# Patient Record
Sex: FEMALE | Race: WHITE | Employment: OTHER | URBAN - NONMETROPOLITAN AREA
[De-identification: names, ages, dates, MRNs, and addresses within clinical notes are randomized per-mention and may not be internally consistent; named-entity substitution may affect disease eponyms.]

---

## 2017-01-17 PROBLEM — N18.30 CHRONIC KIDNEY DISEASE, STAGE III (MODERATE) (HCC): Status: ACTIVE | Noted: 2017-01-17

## 2017-01-17 PROBLEM — I25.10 CORONARY ARTERY DISEASE INVOLVING NATIVE CORONARY ARTERY OF NATIVE HEART WITHOUT ANGINA PECTORIS: Status: ACTIVE | Noted: 2017-01-17

## 2017-01-18 ENCOUNTER — OUTSIDE SERVICES (OUTPATIENT)
Dept: INTERNAL MEDICINE | Age: 82
End: 2017-01-18

## 2017-01-18 DIAGNOSIS — I10 ESSENTIAL HYPERTENSION: ICD-10-CM

## 2017-01-18 DIAGNOSIS — F32.5 MAJOR DEPRESSIVE DISORDER WITH SINGLE EPISODE, IN REMISSION (HCC): ICD-10-CM

## 2017-01-18 DIAGNOSIS — E78.2 MIXED HYPERLIPIDEMIA: ICD-10-CM

## 2017-01-18 DIAGNOSIS — E03.8 OTHER SPECIFIED HYPOTHYROIDISM: Primary | ICD-10-CM

## 2017-01-18 DIAGNOSIS — N18.30 CHRONIC KIDNEY DISEASE, STAGE III (MODERATE) (HCC): ICD-10-CM

## 2017-01-18 DIAGNOSIS — I25.10 CORONARY ARTERY DISEASE INVOLVING NATIVE CORONARY ARTERY OF NATIVE HEART WITHOUT ANGINA PECTORIS: ICD-10-CM

## 2017-01-18 DIAGNOSIS — E22.2 SIADH (SYNDROME OF INAPPROPRIATE ADH PRODUCTION) (HCC): ICD-10-CM

## 2017-01-18 DIAGNOSIS — G24.01 TARDIVE DYSKINESIA: ICD-10-CM

## 2017-01-18 PROCEDURE — 99307 SBSQ NF CARE SF MDM 10: CPT | Performed by: INTERNAL MEDICINE

## 2017-02-21 ENCOUNTER — OUTSIDE SERVICES (OUTPATIENT)
Dept: INTERNAL MEDICINE | Age: 82
End: 2017-02-21

## 2017-02-21 DIAGNOSIS — N18.30 CHRONIC KIDNEY DISEASE, STAGE III (MODERATE) (HCC): ICD-10-CM

## 2017-02-21 DIAGNOSIS — I25.10 CORONARY ARTERY DISEASE INVOLVING NATIVE CORONARY ARTERY OF NATIVE HEART WITHOUT ANGINA PECTORIS: ICD-10-CM

## 2017-02-21 DIAGNOSIS — F32.5 MAJOR DEPRESSIVE DISORDER WITH SINGLE EPISODE, IN REMISSION (HCC): ICD-10-CM

## 2017-02-21 DIAGNOSIS — S81.812A SKIN TEAR OF LOWER LEG WITHOUT COMPLICATION, LEFT, INITIAL ENCOUNTER: Primary | ICD-10-CM

## 2017-02-21 DIAGNOSIS — E78.2 MIXED HYPERLIPIDEMIA: ICD-10-CM

## 2017-02-21 DIAGNOSIS — G24.01 TARDIVE DYSKINESIA: ICD-10-CM

## 2017-02-21 DIAGNOSIS — E03.8 OTHER SPECIFIED HYPOTHYROIDISM: ICD-10-CM

## 2017-02-21 DIAGNOSIS — E22.2 SIADH (SYNDROME OF INAPPROPRIATE ADH PRODUCTION) (HCC): ICD-10-CM

## 2017-02-21 DIAGNOSIS — I10 ESSENTIAL HYPERTENSION: ICD-10-CM

## 2017-02-21 PROCEDURE — 99308 SBSQ NF CARE LOW MDM 20: CPT | Performed by: INTERNAL MEDICINE

## 2017-03-24 ENCOUNTER — OUTSIDE SERVICES (OUTPATIENT)
Dept: INTERNAL MEDICINE | Age: 82
End: 2017-03-24
Payer: MEDICARE

## 2017-03-24 DIAGNOSIS — F32.5 MAJOR DEPRESSIVE DISORDER WITH SINGLE EPISODE, IN REMISSION (HCC): ICD-10-CM

## 2017-03-24 DIAGNOSIS — E03.8 OTHER SPECIFIED HYPOTHYROIDISM: Primary | ICD-10-CM

## 2017-03-24 DIAGNOSIS — N18.30 CHRONIC KIDNEY DISEASE, STAGE III (MODERATE) (HCC): ICD-10-CM

## 2017-03-24 DIAGNOSIS — E78.2 MIXED HYPERLIPIDEMIA: ICD-10-CM

## 2017-03-24 DIAGNOSIS — G24.01 TARDIVE DYSKINESIA: ICD-10-CM

## 2017-03-24 DIAGNOSIS — I25.10 CORONARY ARTERY DISEASE INVOLVING NATIVE CORONARY ARTERY OF NATIVE HEART WITHOUT ANGINA PECTORIS: ICD-10-CM

## 2017-03-24 DIAGNOSIS — I10 ESSENTIAL HYPERTENSION: ICD-10-CM

## 2017-03-24 DIAGNOSIS — E22.2 SIADH (SYNDROME OF INAPPROPRIATE ADH PRODUCTION) (HCC): ICD-10-CM

## 2017-03-24 PROCEDURE — 99307 SBSQ NF CARE SF MDM 10: CPT | Performed by: INTERNAL MEDICINE

## 2017-04-18 LAB — TSH SERPL DL<=0.05 MIU/L-ACNC: 5.32 MIU/L (ref 0.3–5)

## 2017-04-21 ENCOUNTER — TELEPHONE (OUTPATIENT)
Dept: INTERNAL MEDICINE | Age: 82
End: 2017-04-21

## 2017-04-21 RX ORDER — LEVOTHYROXINE SODIUM 0.1 MG/1
100 TABLET ORAL DAILY
COMMUNITY

## 2017-04-27 LAB
ANION GAP SERPL CALCULATED.3IONS-SCNC: 12 MMOL/L (ref 9–17)
BUN BLDV-MCNC: 18 MG/DL (ref 8–23)
BUN/CREAT BLD: 24 (ref 9–20)
CALCIUM SERPL-MCNC: 9.2 MG/DL (ref 8.6–10.4)
CHLORIDE BLD-SCNC: 103 MMOL/L (ref 98–107)
CO2: 28 MMOL/L (ref 20–31)
CREAT SERPL-MCNC: 0.75 MG/DL (ref 0.5–0.9)
GFR AFRICAN AMERICAN: >60 ML/MIN
GFR NON-AFRICAN AMERICAN: >60 ML/MIN
GFR SERPL CREATININE-BSD FRML MDRD: ABNORMAL ML/MIN/{1.73_M2}
GFR SERPL CREATININE-BSD FRML MDRD: ABNORMAL ML/MIN/{1.73_M2}
GLUCOSE BLD-MCNC: 95 MG/DL (ref 70–99)
POTASSIUM SERPL-SCNC: 4.2 MMOL/L (ref 3.7–5.3)
SODIUM BLD-SCNC: 143 MMOL/L (ref 135–144)

## 2017-04-30 PROCEDURE — 99307 SBSQ NF CARE SF MDM 10: CPT | Performed by: INTERNAL MEDICINE

## 2017-05-01 ENCOUNTER — OUTSIDE SERVICES (OUTPATIENT)
Dept: INTERNAL MEDICINE | Age: 82
End: 2017-05-01
Payer: MEDICARE

## 2017-05-01 DIAGNOSIS — G24.01 TARDIVE DYSKINESIA: ICD-10-CM

## 2017-05-01 DIAGNOSIS — F32.5 MAJOR DEPRESSIVE DISORDER WITH SINGLE EPISODE, IN REMISSION (HCC): ICD-10-CM

## 2017-05-01 DIAGNOSIS — E22.2 SIADH (SYNDROME OF INAPPROPRIATE ADH PRODUCTION) (HCC): ICD-10-CM

## 2017-05-01 DIAGNOSIS — I10 ESSENTIAL HYPERTENSION: ICD-10-CM

## 2017-05-01 DIAGNOSIS — I25.10 CORONARY ARTERY DISEASE INVOLVING NATIVE CORONARY ARTERY OF NATIVE HEART WITHOUT ANGINA PECTORIS: ICD-10-CM

## 2017-05-01 DIAGNOSIS — N18.30 CHRONIC KIDNEY DISEASE, STAGE III (MODERATE) (HCC): ICD-10-CM

## 2017-05-01 DIAGNOSIS — E78.2 MIXED HYPERLIPIDEMIA: ICD-10-CM

## 2017-05-01 DIAGNOSIS — E03.8 OTHER SPECIFIED HYPOTHYROIDISM: Primary | ICD-10-CM

## 2017-05-04 ENCOUNTER — TELEPHONE (OUTPATIENT)
Dept: INTERNAL MEDICINE | Age: 82
End: 2017-05-04

## 2017-05-30 ENCOUNTER — OUTSIDE SERVICES (OUTPATIENT)
Dept: INTERNAL MEDICINE | Age: 82
End: 2017-05-30
Payer: MEDICARE

## 2017-05-30 DIAGNOSIS — E03.8 OTHER SPECIFIED HYPOTHYROIDISM: Primary | ICD-10-CM

## 2017-05-30 DIAGNOSIS — G24.01 TARDIVE DYSKINESIA: ICD-10-CM

## 2017-05-30 DIAGNOSIS — E22.2 SIADH (SYNDROME OF INAPPROPRIATE ADH PRODUCTION) (HCC): ICD-10-CM

## 2017-05-30 DIAGNOSIS — I25.10 CORONARY ARTERY DISEASE INVOLVING NATIVE CORONARY ARTERY OF NATIVE HEART WITHOUT ANGINA PECTORIS: ICD-10-CM

## 2017-05-30 DIAGNOSIS — E78.2 MIXED HYPERLIPIDEMIA: ICD-10-CM

## 2017-05-30 DIAGNOSIS — F32.5 MAJOR DEPRESSIVE DISORDER WITH SINGLE EPISODE, IN REMISSION (HCC): ICD-10-CM

## 2017-05-30 DIAGNOSIS — I10 ESSENTIAL HYPERTENSION: ICD-10-CM

## 2017-05-30 DIAGNOSIS — N18.30 CHRONIC KIDNEY DISEASE, STAGE III (MODERATE) (HCC): ICD-10-CM

## 2017-05-30 PROCEDURE — 99307 SBSQ NF CARE SF MDM 10: CPT | Performed by: INTERNAL MEDICINE

## 2017-06-25 PROCEDURE — 99307 SBSQ NF CARE SF MDM 10: CPT | Performed by: INTERNAL MEDICINE

## 2017-07-15 ENCOUNTER — OUTSIDE SERVICES (OUTPATIENT)
Dept: INTERNAL MEDICINE | Age: 82
End: 2017-07-15
Payer: MEDICARE

## 2017-07-15 DIAGNOSIS — N18.30 CHRONIC KIDNEY DISEASE, STAGE III (MODERATE) (HCC): ICD-10-CM

## 2017-07-15 DIAGNOSIS — E22.2 SIADH (SYNDROME OF INAPPROPRIATE ADH PRODUCTION) (HCC): ICD-10-CM

## 2017-07-15 DIAGNOSIS — F32.5 MAJOR DEPRESSIVE DISORDER WITH SINGLE EPISODE, IN REMISSION (HCC): ICD-10-CM

## 2017-07-15 DIAGNOSIS — I25.10 CORONARY ARTERY DISEASE INVOLVING NATIVE CORONARY ARTERY OF NATIVE HEART WITHOUT ANGINA PECTORIS: ICD-10-CM

## 2017-07-15 DIAGNOSIS — E78.2 MIXED HYPERLIPIDEMIA: ICD-10-CM

## 2017-07-15 DIAGNOSIS — G24.01 TARDIVE DYSKINESIA: ICD-10-CM

## 2017-07-15 DIAGNOSIS — E03.8 OTHER SPECIFIED HYPOTHYROIDISM: Primary | ICD-10-CM

## 2017-07-15 DIAGNOSIS — I10 ESSENTIAL HYPERTENSION: ICD-10-CM

## 2017-07-21 LAB
THYROXINE, FREE: 1.25 NG/DL (ref 0.93–1.7)
TSH SERPL DL<=0.05 MIU/L-ACNC: 12.44 MIU/L (ref 0.3–5)

## 2017-07-24 PROCEDURE — 99307 SBSQ NF CARE SF MDM 10: CPT | Performed by: INTERNAL MEDICINE

## 2017-07-27 LAB — THYROXINE, FREE: 1.23 NG/DL (ref 0.93–1.7)

## 2017-08-15 ENCOUNTER — OUTSIDE SERVICES (OUTPATIENT)
Dept: INTERNAL MEDICINE | Age: 82
End: 2017-08-15
Payer: MEDICARE

## 2017-08-15 DIAGNOSIS — G24.01 TARDIVE DYSKINESIA: ICD-10-CM

## 2017-08-15 DIAGNOSIS — E78.2 MIXED HYPERLIPIDEMIA: ICD-10-CM

## 2017-08-15 DIAGNOSIS — E22.2 SIADH (SYNDROME OF INAPPROPRIATE ADH PRODUCTION) (HCC): ICD-10-CM

## 2017-08-15 DIAGNOSIS — I25.10 CORONARY ARTERY DISEASE INVOLVING NATIVE CORONARY ARTERY OF NATIVE HEART WITHOUT ANGINA PECTORIS: ICD-10-CM

## 2017-08-15 DIAGNOSIS — F32.5 MAJOR DEPRESSIVE DISORDER WITH SINGLE EPISODE, IN REMISSION (HCC): ICD-10-CM

## 2017-08-15 DIAGNOSIS — N18.30 CHRONIC KIDNEY DISEASE, STAGE III (MODERATE) (HCC): ICD-10-CM

## 2017-08-15 DIAGNOSIS — I10 ESSENTIAL HYPERTENSION: ICD-10-CM

## 2017-08-15 DIAGNOSIS — E03.4 HYPOTHYROIDISM DUE TO ACQUIRED ATROPHY OF THYROID: Primary | ICD-10-CM

## 2017-08-20 PROCEDURE — 99307 SBSQ NF CARE SF MDM 10: CPT | Performed by: INTERNAL MEDICINE

## 2017-09-18 ENCOUNTER — OUTSIDE SERVICES (OUTPATIENT)
Dept: INTERNAL MEDICINE | Age: 82
End: 2017-09-18
Payer: MEDICARE

## 2017-09-18 DIAGNOSIS — F32.5 MAJOR DEPRESSIVE DISORDER WITH SINGLE EPISODE, IN REMISSION (HCC): ICD-10-CM

## 2017-09-18 DIAGNOSIS — I10 ESSENTIAL HYPERTENSION: Primary | ICD-10-CM

## 2017-09-18 DIAGNOSIS — E78.2 MIXED HYPERLIPIDEMIA: ICD-10-CM

## 2017-09-18 DIAGNOSIS — E03.4 HYPOTHYROIDISM DUE TO ACQUIRED ATROPHY OF THYROID: ICD-10-CM

## 2017-09-18 DIAGNOSIS — E22.2 SIADH (SYNDROME OF INAPPROPRIATE ADH PRODUCTION) (HCC): ICD-10-CM

## 2017-09-18 DIAGNOSIS — G24.01 TARDIVE DYSKINESIA: ICD-10-CM

## 2017-09-18 DIAGNOSIS — N18.30 CHRONIC KIDNEY DISEASE, STAGE III (MODERATE) (HCC): ICD-10-CM

## 2017-09-20 ENCOUNTER — HOSPITAL ENCOUNTER (OUTPATIENT)
Age: 82
Setting detail: SPECIMEN
Discharge: HOME | End: 2017-09-20
Payer: MEDICARE

## 2017-09-20 LAB
THYROXINE, FREE: 1.68 NG/DL (ref 0.93–1.7)
TSH SERPL DL<=0.05 MIU/L-ACNC: 1.95 MIU/L (ref 0.3–5)

## 2017-09-20 PROCEDURE — 36415 COLL VENOUS BLD VENIPUNCTURE: CPT

## 2017-09-20 PROCEDURE — 84443 ASSAY THYROID STIM HORMONE: CPT

## 2017-09-20 PROCEDURE — 84439 ASSAY OF FREE THYROXINE: CPT

## 2017-09-24 PROCEDURE — 99307 SBSQ NF CARE SF MDM 10: CPT | Performed by: INTERNAL MEDICINE

## 2017-10-09 ENCOUNTER — TELEPHONE (OUTPATIENT)
Dept: INTERNAL MEDICINE | Age: 82
End: 2017-10-09

## 2017-10-10 ENCOUNTER — HOSPITAL ENCOUNTER (OUTPATIENT)
Age: 82
Setting detail: SPECIMEN
Discharge: HOME | End: 2017-10-10
Payer: MEDICARE

## 2017-10-10 LAB
-: ABNORMAL
AMORPHOUS: ABNORMAL
BACTERIA: ABNORMAL
BILIRUBIN URINE: NEGATIVE
CASTS UA: ABNORMAL /LPF (ref 0–2)
COLOR: ABNORMAL
COMMENT UA: ABNORMAL
CRYSTALS, UA: ABNORMAL /HPF
EPITHELIAL CELLS UA: ABNORMAL /HPF (ref 0–5)
GLUCOSE URINE: NEGATIVE
KETONES, URINE: NEGATIVE
LEUKOCYTE ESTERASE, URINE: NEGATIVE
MUCUS: ABNORMAL
NITRITE, URINE: POSITIVE
OTHER OBSERVATIONS UA: ABNORMAL
PH UA: 5.5 (ref 5–6)
PROTEIN UA: NEGATIVE
RBC UA: ABNORMAL /HPF (ref 0–4)
RENAL EPITHELIAL, UA: ABNORMAL /HPF
SPECIFIC GRAVITY UA: 1.02 (ref 1.01–1.02)
TRICHOMONAS: ABNORMAL
TURBIDITY: ABNORMAL
URINE HGB: ABNORMAL
UROBILINOGEN, URINE: NORMAL
WBC UA: ABNORMAL /HPF (ref 0–4)
YEAST: PRESENT

## 2017-10-10 PROCEDURE — 87086 URINE CULTURE/COLONY COUNT: CPT

## 2017-10-10 PROCEDURE — 87186 SC STD MICRODIL/AGAR DIL: CPT

## 2017-10-10 PROCEDURE — 81001 URINALYSIS AUTO W/SCOPE: CPT

## 2017-10-10 PROCEDURE — 87088 URINE BACTERIA CULTURE: CPT

## 2017-10-11 LAB
CULTURE: ABNORMAL
CULTURE: ABNORMAL
Lab: ABNORMAL
ORGANISM: ABNORMAL
SPECIMEN DESCRIPTION: ABNORMAL
SPECIMEN DESCRIPTION: ABNORMAL
STATUS: ABNORMAL

## 2017-10-11 PROCEDURE — 99308 SBSQ NF CARE LOW MDM 20: CPT | Performed by: NURSE PRACTITIONER

## 2017-10-12 ENCOUNTER — TELEPHONE (OUTPATIENT)
Dept: INTERNAL MEDICINE | Age: 82
End: 2017-10-12

## 2017-10-12 PROCEDURE — 99308 SBSQ NF CARE LOW MDM 20: CPT | Performed by: NURSE PRACTITIONER

## 2017-10-12 NOTE — TELEPHONE ENCOUNTER
----- Message from Anastasia Cristobal DO sent at 10/12/2017  2:57 PM EDT -----  Has UTI. What antibiotic is she on? Check with Alexey.

## 2017-10-22 ENCOUNTER — OUTSIDE SERVICES (OUTPATIENT)
Dept: INTERNAL MEDICINE | Age: 82
End: 2017-10-22
Payer: MEDICARE

## 2017-10-22 DIAGNOSIS — N39.0 URINARY TRACT INFECTION WITHOUT HEMATURIA, SITE UNSPECIFIED: Primary | ICD-10-CM

## 2017-10-22 ASSESSMENT — ENCOUNTER SYMPTOMS
EYE DISCHARGE: 0
VOMITING: 0
EYE REDNESS: 0
CONSTIPATION: 0
WHEEZING: 0
ABDOMINAL PAIN: 0
SORE THROAT: 0
EYE PAIN: 0
ORTHOPNEA: 0
BLOOD IN STOOL: 0
COUGH: 0
BACK PAIN: 0
SHORTNESS OF BREATH: 0
DIARRHEA: 0
NAUSEA: 0

## 2017-10-22 NOTE — PROGRESS NOTES
10/12/17  Erick Si  5/30/1922  Resident of [facility] [place]    Chief Complaint  Urinary frequency, urinary urgency, change in mentation   `  HPI:  Same patient today for complaints of urinary frequency. Nursing aides noticed she has been urinating more frequenct. Odor to urine. No fevers. No chills. Vitals have been stable. Patient slightly more confused. UA completed. On chart for review.     Allergies   Allergen Reactions    Prednisone      GI upset    Quinapril Hcl      Cough    Vicodin [Hydrocodone-Acetaminophen]      Incontinence       Past Medical History:   Diagnosis Date    Altered mental status, unspecified 11/20/2015    Likely medication related-tricyclic antidepressant     Benign essential tremor     right hand    Chest pain 9/21/2010    Chronic kidney disease, stage III (moderate) 1/17/2017    Coronary artery disease involving native coronary artery of native heart without angina pectoris 1/17/2017    Dermatitis     possibly monilia, buttocks, improved    DJD (degenerative joint disease)     Gait apraxia     Mild    Hyperlipidemia     Hypertension     hypertensive urgency, 9/21/2010    Hypothyroidism     Iron deficiency anemia     Lower urinary tract infectious disease 4/8/2014     replace inactive diagnosis    Major depressive disorder with single episode, in remission ([address] 7/25/2013    Orthostatic hypotension 4/7/2014    Osteoarthritis     SIADH (syndrome of inappropriate ADH production) ([address]     improved    Subdural hematoma ([address] September 2009    drained    Symptoms involving urinary system     Postmenopausal, treated with Premarin cream in the past    Syncope, near 4/7/2014    Tardive dyskinesia 11/20/2015       Past Surgical History:   Procedure Laterality Date    APPENDECTOMY  1940    INTRACAPSULAR CATARACT EXTRACTION Bilateral 2000    SUBDURAL HEMORRHAGE DRAINAGE  September 2009    Status post    DINORAH AND BSO  5602    UMBILICAL HERNIA REPAIR [facility]    Social History     Social History    Marital status:      Spouse name: N/A    Number of children: N/A    Years of education: 15     Occupational History    Not on file. Social History Main Topics    Smoking status: Never Smoker    Smokeless tobacco: Never Used      Comment: s beronica 3/30/15    Alcohol use No      Comment: occasionally    Drug use: No    Sexual activity: Not on file     Other Topics Concern    Not on file     Social History Narrative    Born in [place], worked as a  in the past       Review of Systems   Constitutional: Negative for chills and fever. HENT: Negative for sore throat. Eyes: Negative for pain, discharge and redness. Respiratory: Negative for cough, shortness of breath and wheezing. Cardiovascular: Negative for chest pain, orthopnea and leg swelling. Gastrointestinal: Negative for abdominal pain, blood in stool, constipation, diarrhea, nausea and vomiting. Genitourinary: Positive for frequency and urgency. Negative for dysuria, flank pain and hematuria. Musculoskeletal: Negative for back pain, myalgias and neck pain. Skin: Negative for rash. Neurological: Negative for dizziness, tremors, sensory change, speech change, seizures, weakness and headaches. Psychiatric/Behavioral: Positive for memory loss. Negative for depression. The patient is not nervous/anxious. Physical Exam   Constitutional: She is well-developed, well-nourished, and in no distress. No distress. HENT:   Head: Normocephalic and atraumatic. Right Ear: External ear normal.   Left Ear: External ear normal.   Eyes: Right eye exhibits no discharge. Left eye exhibits no discharge. Neck: Neck supple. No tracheal deviation present. Cardiovascular: Normal rate and regular rhythm. Exam reveals no gallop and no friction rub. No murmur heard.   Pulmonary/Chest: Effort normal and breath sounds normal. No respiratory distress. She has no wheezes. She has no rales. She exhibits no tenderness. Abdominal: Soft. Bowel sounds are normal. She exhibits no distension. There is no tenderness. No CVA tenderness, no suprapubic pain    Musculoskeletal: She exhibits no edema. Neurological: She is alert. No cranial nerve deficit. Skin: Skin is warm and dry. No rash noted. She is not diaphoretic. Psychiatric: Mood and affect normal.     Vital signs as per the chart at [facility]/reviewed/afebrile/stable    Assessment:  1. Urinary tract infection without hematuria, site unspecified  Bactrim DS one tab twice a day for 5 days, increase fluid. Culture shows susceptibility to Bactrim. Plan:  As noted above. Follow up for routine visit. Call sooner with concerns prior.     Electronically signed by Chantel Tabares CNP on 10/22/2017 at 2:08 PM

## 2017-10-23 ENCOUNTER — OUTSIDE SERVICES (OUTPATIENT)
Dept: INTERNAL MEDICINE | Age: 82
End: 2017-10-23
Payer: MEDICARE

## 2017-10-23 DIAGNOSIS — H61.22 IMPACTED CERUMEN, LEFT EAR: Primary | ICD-10-CM

## 2017-10-23 ASSESSMENT — ENCOUNTER SYMPTOMS
WHEEZING: 0
EYE DISCHARGE: 0
SHORTNESS OF BREATH: 0
SINUS PAIN: 0
COUGH: 0
ORTHOPNEA: 0
EYE REDNESS: 0
EYE PAIN: 0
SORE THROAT: 0

## 2017-10-23 NOTE — PROGRESS NOTES
10/11/17  Chang Smith  5/30/1922  Resident at the [facility]. Chief Complaint: left ear pain     1. Impacted cerumen, left ear        HPI:  Patient has been complaining to nursing staff over the last few weeks with left ear discomfort. Nothing makes it worse nothing makes it better. No trauma. No drainage. No fevers or chills. No upper respiratory complaints. No cough. No current treatment. Progressively worsening.     Allergies   Allergen Reactions    Prednisone      GI upset    Quinapril Hcl      Cough    Vicodin [Hydrocodone-Acetaminophen]      Incontinence       Past Medical History:   Diagnosis Date    Altered mental status, unspecified 11/20/2015    Likely medication related-tricyclic antidepressant     Benign essential tremor     right hand    Chest pain 9/21/2010    Chronic kidney disease, stage III (moderate) 1/17/2017    Coronary artery disease involving native coronary artery of native heart without angina pectoris 1/17/2017    Dermatitis     possibly monilia, buttocks, improved    DJD (degenerative joint disease)     Gait apraxia     Mild    Hyperlipidemia     Hypertension     hypertensive urgency, 9/21/2010    Hypothyroidism     Iron deficiency anemia     Lower urinary tract infectious disease 4/8/2014     replace inactive diagnosis    Major depressive disorder with single episode, in remission ([address] 7/25/2013    Orthostatic hypotension 4/7/2014    Osteoarthritis     SIADH (syndrome of inappropriate ADH production) ([address]     improved    Subdural hematoma ([address] September 2009    drained    Symptoms involving urinary system     Postmenopausal, treated with Premarin cream in the past    Syncope, near 4/7/2014    Tardive dyskinesia 11/20/2015       Past Surgical History:   Procedure Laterality Date    APPENDECTOMY  1940    INTRACAPSULAR CATARACT EXTRACTION Bilateral 2000    SUBDURAL HEMORRHAGE DRAINAGE  September 2009    Status post   [address] and atraumatic. Right Ear: External ear normal. No drainage, swelling or tenderness. Tympanic membrane is not erythematous, not retracted and not bulging. No decreased hearing is noted. Left Ear: There is tenderness. No drainage or swelling. Decreased hearing is noted. Large amount of light calvo cerumen to left canal- unable to visualize TM. No drainage    Eyes: Right eye exhibits no discharge. Left eye exhibits no discharge. Neck: Neck supple. No tracheal deviation present. Cardiovascular: Normal rate, regular rhythm and normal heart sounds. Exam reveals no gallop and no friction rub. No murmur heard. Pulmonary/Chest: Effort normal and breath sounds normal. No respiratory distress. She has no wheezes. She has no rales. She exhibits no tenderness. Abdominal: Soft. Bowel sounds are normal. She exhibits no distension. There is no tenderness. Musculoskeletal: She exhibits edema. Neurological: She is alert. Skin: Skin is warm and dry. No rash noted. She is not diaphoretic. Psychiatric: Affect normal.   Nursing note and vitals reviewed. Vital signs: Temperature 97.8°F, blood pressure 126/77 pulse 68, respirations 16 SPO2 95% RA. Assessment:  1. Impacted cerumen, left ear  D Brox 4 drops twice a day for the next 4 days then irrigate. Notify me if worsening or persisting. Plan:  As noted above. Follow up for routine visit. Call sooner with concerns prior.     Electronically signed by Daisy Dueñas CNP on 10/23/2017 at 2:48 PM

## 2017-10-28 ENCOUNTER — OUTSIDE SERVICES (OUTPATIENT)
Dept: INTERNAL MEDICINE | Age: 82
End: 2017-10-28
Payer: MEDICARE

## 2017-10-28 DIAGNOSIS — G24.01 TARDIVE DYSKINESIA: ICD-10-CM

## 2017-10-28 DIAGNOSIS — E03.4 HYPOTHYROIDISM DUE TO ACQUIRED ATROPHY OF THYROID: ICD-10-CM

## 2017-10-28 DIAGNOSIS — F32.5 MAJOR DEPRESSIVE DISORDER WITH SINGLE EPISODE, IN REMISSION (HCC): ICD-10-CM

## 2017-10-28 DIAGNOSIS — I10 ESSENTIAL HYPERTENSION: Primary | ICD-10-CM

## 2017-10-28 DIAGNOSIS — E78.2 MIXED HYPERLIPIDEMIA: ICD-10-CM

## 2017-10-28 DIAGNOSIS — E22.2 SIADH (SYNDROME OF INAPPROPRIATE ADH PRODUCTION) (HCC): ICD-10-CM

## 2017-10-28 DIAGNOSIS — N18.30 CHRONIC KIDNEY DISEASE, STAGE III (MODERATE) (HCC): ICD-10-CM

## 2017-10-29 PROCEDURE — 99307 SBSQ NF CARE SF MDM 10: CPT | Performed by: INTERNAL MEDICINE

## 2017-10-29 NOTE — PROGRESS NOTES
updates summarized and entered into electronic record  3. See nursing home orders and MAR.       Electronically signed by Scar Castro DO on 10/28/2017 at 9:49 PM  Internal Medicine

## 2017-11-19 ENCOUNTER — OUTSIDE SERVICES (OUTPATIENT)
Dept: INTERNAL MEDICINE | Age: 82
End: 2017-11-19
Payer: MEDICARE

## 2017-11-19 DIAGNOSIS — N18.30 CHRONIC KIDNEY DISEASE, STAGE III (MODERATE) (HCC): ICD-10-CM

## 2017-11-19 DIAGNOSIS — E03.4 HYPOTHYROIDISM DUE TO ACQUIRED ATROPHY OF THYROID: ICD-10-CM

## 2017-11-19 DIAGNOSIS — F32.5 MAJOR DEPRESSIVE DISORDER WITH SINGLE EPISODE, IN REMISSION (HCC): ICD-10-CM

## 2017-11-19 DIAGNOSIS — E78.2 MIXED HYPERLIPIDEMIA: ICD-10-CM

## 2017-11-19 DIAGNOSIS — E22.2 SIADH (SYNDROME OF INAPPROPRIATE ADH PRODUCTION) (HCC): ICD-10-CM

## 2017-11-19 DIAGNOSIS — I10 ESSENTIAL HYPERTENSION: Primary | ICD-10-CM

## 2017-11-19 DIAGNOSIS — G24.01 TARDIVE DYSKINESIA: ICD-10-CM

## 2017-11-20 ENCOUNTER — TELEPHONE (OUTPATIENT)
Dept: INTERNAL MEDICINE | Age: 82
End: 2017-11-20

## 2017-11-20 NOTE — PROGRESS NOTES
updates summarized and entered into electronic record  3. See nursing home orders and MAR.       Electronically signed by Carlos Garcia DO on 11/19/2017 at 9:15 PM  Internal Medicine

## 2017-11-21 PROCEDURE — 99307 SBSQ NF CARE SF MDM 10: CPT | Performed by: INTERNAL MEDICINE

## 2017-11-23 ENCOUNTER — HOSPITAL ENCOUNTER (OUTPATIENT)
Age: 82
Setting detail: SPECIMEN
Discharge: HOME | End: 2017-11-23
Payer: MEDICARE

## 2017-11-23 LAB
-: ABNORMAL
AMORPHOUS: ABNORMAL
BACTERIA: ABNORMAL
BILIRUBIN URINE: NEGATIVE
CASTS UA: ABNORMAL /LPF (ref 0–2)
COLOR: ABNORMAL
COMMENT UA: ABNORMAL
CRYSTALS, UA: ABNORMAL /HPF
EPITHELIAL CELLS UA: ABNORMAL /HPF (ref 0–5)
GLUCOSE URINE: NEGATIVE
KETONES, URINE: NEGATIVE
LEUKOCYTE ESTERASE, URINE: ABNORMAL
MUCUS: ABNORMAL
NITRITE, URINE: POSITIVE
OTHER OBSERVATIONS UA: ABNORMAL
PH UA: >=9 (ref 5–6)
PROTEIN UA: ABNORMAL
RBC UA: ABNORMAL /HPF (ref 0–4)
RENAL EPITHELIAL, UA: ABNORMAL /HPF
SPECIFIC GRAVITY UA: 1 (ref 1.01–1.02)
TRICHOMONAS: ABNORMAL
TURBIDITY: ABNORMAL
URINE HGB: ABNORMAL
UROBILINOGEN, URINE: NORMAL
WBC UA: ABNORMAL /HPF (ref 0–4)
YEAST: ABNORMAL

## 2017-11-23 PROCEDURE — 87086 URINE CULTURE/COLONY COUNT: CPT

## 2017-11-23 PROCEDURE — 87186 SC STD MICRODIL/AGAR DIL: CPT

## 2017-11-23 PROCEDURE — 81001 URINALYSIS AUTO W/SCOPE: CPT

## 2017-11-23 PROCEDURE — 87088 URINE BACTERIA CULTURE: CPT

## 2017-11-26 LAB
CULTURE: ABNORMAL
Lab: ABNORMAL
ORGANISM: ABNORMAL
SPECIMEN DESCRIPTION: ABNORMAL
SPECIMEN DESCRIPTION: ABNORMAL
STATUS: ABNORMAL

## 2017-11-27 ENCOUNTER — TELEPHONE (OUTPATIENT)
Dept: INTERNAL MEDICINE | Age: 82
End: 2017-11-27

## 2017-11-27 NOTE — TELEPHONE ENCOUNTER
The note I got said Dr. Ermias Chris ordered Zosyn. Did he actually not? Order Zosyn 3.375 grams IV every 6 hours for 10 days.

## 2017-11-28 ENCOUNTER — HOSPITAL ENCOUNTER (EMERGENCY)
Age: 82
Discharge: HOME | End: 2017-11-28
Attending: EMERGENCY MEDICINE
Payer: MEDICARE

## 2017-11-28 VITALS
SYSTOLIC BLOOD PRESSURE: 126 MMHG | DIASTOLIC BLOOD PRESSURE: 59 MMHG | RESPIRATION RATE: 18 BRPM | HEART RATE: 76 BPM | OXYGEN SATURATION: 95 %

## 2017-11-28 DIAGNOSIS — N39.0 URINARY TRACT INFECTION WITHOUT HEMATURIA, SITE UNSPECIFIED: Primary | ICD-10-CM

## 2017-11-28 PROCEDURE — 99283 EMERGENCY DEPT VISIT LOW MDM: CPT

## 2017-11-28 ASSESSMENT — ENCOUNTER SYMPTOMS
DIARRHEA: 0
ABDOMINAL PAIN: 0
VOMITING: 0
NAUSEA: 0
BACK PAIN: 0
RHINORRHEA: 0
EYE PAIN: 0
COUGH: 0
SHORTNESS OF BREATH: 0
SORE THROAT: 0

## 2017-11-29 NOTE — ED PROVIDER NOTES
(syndrome of inappropriate ADH production) ([facility] Subdural hematoma ([facility] Symptoms involving urinary system; Syncope, near; and Tardive dyskinesia. SURGICAL HISTORY      has a past surgical history that includes Subdural hemorrhage drainage (2009); leia and bso (cervix removed) (); Appendectomy (); Intracapsular cataract extraction (Bilateral, 0743); and Umbilical hernia repair. CURRENT MEDICATIONS       Current Discharge Medication List      CONTINUE these medications which have NOT CHANGED    Details   piperacillin-tazobactam (ZOSYN) 3-0.375 GM per 50ML IVPB extended infusion Infuse 3.375 g intravenously every 6 hours      levothyroxine (SYNTHROID) 100 MCG tablet Take 100 mcg by mouth Daily      amoxapine (ASENDIN) 150 MG tablet Take 1 tablet by mouth nightly  Qty: 30 tablet, Refills: 0      ferrous sulfate 325 (65 FE) MG tablet Take 325 mg by mouth 2 times daily      docusate sodium (COLACE) 100 MG capsule Take 100 mg by mouth 2 times daily      acetaminophen (TYLENOL) 325 MG tablet Take 650 mg by mouth every 6 hours as needed for Fever      Handicap Placard MISC by Does not apply route. Expires in 3 years  Qty: 1 each, Refills: 0             ALLERGIES     is allergic to prednisone; quinapril hcl; and vicodin [hydrocodone-acetaminophen]. FAMILY HISTORY     indicated that her mother is . She indicated that her father is . family history includes Heart Disease in her father; Other in her mother. SOCIAL HISTORY      reports that she has never smoked. She has never used smokeless tobacco. She reports that she does not drink alcohol or use drugs. PHYSICAL EXAM     INITIAL VITALS:  blood pressure is 126/59 (abnormal) and her pulse is 76. Her respiration is 18 and oxygen saturation is 95%. Physical Exam   Constitutional: She is oriented to person, place, and time and well-developed, well-nourished, and in no distress.    HENT:   Head: Normocephalic and atraumatic. Eyes: EOM are normal. Right eye exhibits no discharge. Left eye exhibits no discharge. Neck: Normal range of motion. Neck supple. No spinous process tenderness and no muscular tenderness present. Cardiovascular: Normal rate, regular rhythm and normal heart sounds. Pulmonary/Chest: Effort normal and breath sounds normal. No respiratory distress. She has no wheezes. Abdominal: Soft. Bowel sounds are normal. There is no tenderness. Musculoskeletal: Normal range of motion. Neurological: She is alert and oriented to person, place, and time. GCS score is 15. Skin: Skin is warm and dry. No rash noted. Psychiatric: Affect normal.       DIFFERENTIAL DIAGNOSIS/ MDM:     Plan this time will be to establish peripheral IV access. DIAGNOSTIC RESULTS     EKG: All EKG's are interpreted by the Emergency Department Physician who either signs or Co-signs this chart in the absence of a cardiologist.        RADIOLOGY:   I directly visualized the following  images and reviewed the radiologist interpretations:  No orders to display       No results found. LABS:  No results found for this visit on 11/28/17. EMERGENCY DEPARTMENT COURSE:     The patient was given the following medications:  No orders of the defined types were placed in this encounter. Vitals:    Vitals:    11/28/17 2150   BP: (!) 126/59   Pulse: 76   Resp: 18   SpO2: 95%     -------------------------  BP: (!) 126/59,  , Pulse: 76, Resp: 18      Re-evaluation Notes    9:51 PM:   We were able to establish 2 peripheral IVs for the patient. I feel a second is appropriate if the first one does not function appropriately. I feel discharged back to nursing care facility is appropriate at this point. I did not feel further testing is necessary. Patient is released scheduled to have the antibiotics. She is allergic been receiving antibiotics. they were advised to return right away for any new or concerning symptoms.     I have

## 2017-12-06 ENCOUNTER — TELEPHONE (OUTPATIENT)
Dept: INTERNAL MEDICINE | Age: 82
End: 2017-12-06

## 2017-12-13 ENCOUNTER — TELEPHONE (OUTPATIENT)
Dept: INTERNAL MEDICINE | Age: 82
End: 2017-12-13

## 2017-12-13 RX ORDER — MIRTAZAPINE 15 MG/1
15 TABLET, FILM COATED ORAL NIGHTLY
COMMUNITY

## 2017-12-15 ENCOUNTER — TELEPHONE (OUTPATIENT)
Dept: INTERNAL MEDICINE | Age: 82
End: 2017-12-15

## 2017-12-15 ENCOUNTER — HOSPITAL ENCOUNTER (OUTPATIENT)
Age: 82
Setting detail: SPECIMEN
Discharge: HOME | End: 2017-12-15
Payer: MEDICARE

## 2017-12-15 LAB
DIRECT EXAM: NORMAL
Lab: NORMAL
SPECIMEN DESCRIPTION: NORMAL
STATUS: NORMAL

## 2017-12-15 PROCEDURE — 87804 INFLUENZA ASSAY W/OPTIC: CPT

## 2017-12-26 ENCOUNTER — OUTSIDE SERVICES (OUTPATIENT)
Dept: INTERNAL MEDICINE | Age: 82
End: 2017-12-26
Payer: MEDICARE

## 2017-12-26 DIAGNOSIS — I10 ESSENTIAL HYPERTENSION: Primary | ICD-10-CM

## 2017-12-26 DIAGNOSIS — N18.30 CHRONIC KIDNEY DISEASE, STAGE III (MODERATE) (HCC): ICD-10-CM

## 2017-12-26 DIAGNOSIS — F32.5 MAJOR DEPRESSIVE DISORDER WITH SINGLE EPISODE, IN REMISSION (HCC): ICD-10-CM

## 2017-12-26 DIAGNOSIS — E78.2 MIXED HYPERLIPIDEMIA: ICD-10-CM

## 2017-12-26 DIAGNOSIS — E22.2 SIADH (SYNDROME OF INAPPROPRIATE ADH PRODUCTION) (HCC): ICD-10-CM

## 2017-12-26 DIAGNOSIS — E03.4 HYPOTHYROIDISM DUE TO ACQUIRED ATROPHY OF THYROID: ICD-10-CM

## 2017-12-26 DIAGNOSIS — G24.01 TARDIVE DYSKINESIA: ICD-10-CM

## 2017-12-26 PROCEDURE — 99308 SBSQ NF CARE LOW MDM 20: CPT | Performed by: INTERNAL MEDICINE

## 2017-12-26 NOTE — PROGRESS NOTES
DR. Patrice Vora - [facility] VISIT    DATE OF SERVICE: 11/21/17    NURSING HOME: [facility]    CHIEF COMPLAINT/HISTORY OF CHIEF COMPLAINT: This patient is being seen for ongoing evaluation and management of her hypertension, hyperlipidemia, hypothyroidism, depression, chronic kidney disease, syndrome of inappropriate ADH production, and tardive dyskinesia. There are no new complaints at this time. ALLERGIES:   Allergies   Allergen Reactions    Prednisone      GI upset    Quinapril Hcl      Cough    Vicodin [Hydrocodone-Acetaminophen]      Incontinence       MEDICATIONS: As noted on the [facility] MAR, referenced and incorporated herein.     PAST MEDICAL HISTORY:   Past Medical History:   Diagnosis Date    Altered mental status, unspecified 11/20/2015    Likely medication related-tricyclic antidepressant     Benign essential tremor     right hand    Chest pain 9/21/2010    Chronic kidney disease, stage III (moderate) 1/17/2017    Coronary artery disease involving native coronary artery of native heart without angina pectoris 1/17/2017    Dermatitis     possibly monilia, buttocks, improved    DJD (degenerative joint disease)     Gait apraxia     Mild    Hyperlipidemia     Hypertension     hypertensive urgency, 9/21/2010    Hypothyroidism     Iron deficiency anemia     Lower urinary tract infectious disease 4/8/2014     replace inactive diagnosis    Major depressive disorder with single episode, in remission ([address] 7/25/2013    Orthostatic hypotension 4/7/2014    Osteoarthritis     SIADH (syndrome of inappropriate ADH production) ([address]     improved    Subdural hematoma ([address] September 2009    drained    Symptoms involving urinary system     Postmenopausal, treated with Premarin cream in the past    Syncope, near 4/7/2014    Tardive dyskinesia 11/20/2015       PAST SURGICAL HISTORY:   Past Surgical History:   Procedure Laterality Date   Julita Mandel INTRACAPSULAR CATARACT EXTRACTION Bilateral 2000    SUBDURAL HEMORRHAGE DRAINAGE  September 2009    Status post    DINORAH AND BSO  5421    UMBILICAL HERNIA REPAIR         SOCIAL HISTORY:     Tobacco:   History   Smoking Status    Never Smoker   Smokeless Tobacco    Never Used     Comment: sosa loco 3/30/15     Alcohol:   History   Alcohol Use No     Comment: occasionally     Drugs:   History   Drug Use No       FAMILY HISTORY: family history includes Heart Disease in her father; Other in her mother. REVIEW OF SYSTEMS:     Please see history of chief complaint above; otherwise no new problems with respect to General, HEENT, Cardiovascular, Respiratory, Gastrointestinal, Genitourinary, Endocrinologic, Musculoskeletal, or Neuropsychiatric complaints. PHYSICAL EXAMINATION:    Vitals: Temp: 97.9 deg F. Pulse: 72. Resp: 16. BP: 118/74. General: A 89 y.o.  female. Alert and oriented to person, place and questionably oriented to time. She  does not appear to be in any acute distress. Skin: Skin color, texture, turgor normal. No rashes or lesions. HEENT/Neck: Essentially unremarkable  Lungs: Normal - CTA without rales, rhonchi, or wheezing. Heart: regular rate and rhythm, S1, S2 normal, no murmur, click, rub or gallop No S3 or S4. Abdomen: Non-obese soft, non-distended, non-tender, normal active bowel sounds, no masses palpated and no hepatosplenomegaly  Extremities: No clubbing, cyanosis, edema  Neurologic: cranial nerves II-XII are grossly intact. Tardive dyskinesia present. ASSESSMENT:    1. Essential hypertension     2. Mixed hyperlipidemia     3. Hypothyroidism due to acquired atrophy of thyroid     4. Chronic kidney disease, stage III (moderate)     5. SIADH (syndrome of inappropriate ADH production) ([address]     6. Major depressive disorder with single episode, in remission ([facility] [address]     7. Tardive dyskinesia           PLAN:    1. Continue current treatment  2.  Nursing home record reviewed and updates summarized and entered into electronic record  3. See nursing home orders and MAR.       Electronically signed by Vianney Vazquez DO on 12/26/2017 at 1:35 AM  Internal Medicine

## 2017-12-27 NOTE — PROGRESS NOTES
DR. Bao Rivas - [facility] VISIT    DATE OF SERVICE: 12/26/17    NURSING HOME: [facility]    CHIEF COMPLAINT/HISTORY OF CHIEF COMPLAINT: This patient is being seen for ongoing evaluation and management of her hypertension, hyperlipidemia, hypothyroidism, depression, chronic kidney disease, syndrome of inappropriate ADH production, and tardive dyskinesia. Nursing home staff would like to discontinue her saline nasal spray because she has not used it in more than 60 days. There are no new complaints at this time. ALLERGIES:   Allergies   Allergen Reactions    Prednisone      GI upset    Quinapril Hcl      Cough    Vicodin [Hydrocodone-Acetaminophen]      Incontinence       MEDICATIONS: As noted on the [facility] MAR, referenced and incorporated herein.     PAST MEDICAL HISTORY:   Past Medical History:   Diagnosis Date    Altered mental status, unspecified 11/20/2015    Likely medication related-tricyclic antidepressant     Benign essential tremor     right hand    Chest pain 9/21/2010    Chronic kidney disease, stage III (moderate) 1/17/2017    Coronary artery disease involving native coronary artery of native heart without angina pectoris 1/17/2017    Dermatitis     possibly monilia, buttocks, improved    DJD (degenerative joint disease)     Gait apraxia     Mild    Hyperlipidemia     Hypertension     hypertensive urgency, 9/21/2010    Hypothyroidism     Iron deficiency anemia     Lower urinary tract infectious disease 4/8/2014     replace inactive diagnosis    Major depressive disorder with single episode, in remission ([address] 7/25/2013    Orthostatic hypotension 4/7/2014    Osteoarthritis     SIADH (syndrome of inappropriate ADH production) ([address]     improved    Subdural hematoma ([address] September 2009    drained    Symptoms involving urinary system     Postmenopausal, treated with Premarin cream in the past    Syncope, near 4/7/2014    Tardive dyskinesia 11/20/2015       PAST SURGICAL HISTORY:   Past Surgical History:   Procedure Laterality Date    APPENDECTOMY  1940    INTRACAPSULAR CATARACT EXTRACTION Bilateral 2000    SUBDURAL HEMORRHAGE DRAINAGE  September 2009    Status post    DINORAH AND BSO  1554    UMBILICAL HERNIA REPAIR         SOCIAL HISTORY:     Tobacco:   History   Smoking Status    Never Smoker   Smokeless Tobacco    Never Used     Comment: sosa loco 3/30/15     Alcohol:   History   Alcohol Use No     Comment: occasionally     Drugs:   History   Drug Use No       FAMILY HISTORY: family history includes Heart Disease in her father; Other in her mother. REVIEW OF SYSTEMS:     Please see history of chief complaint above; otherwise no new problems with respect to General, HEENT, Cardiovascular, Respiratory, Gastrointestinal, Genitourinary, Endocrinologic, Musculoskeletal, or Neuropsychiatric complaints. PHYSICAL EXAMINATION:    Vitals: Temp: 98.0 deg F. Pulse: 76. Resp: 16. BP: 122/68. General: A 89 y.o.  female. Alert and oriented to person, place and questionably oriented to time. She  does not appear to be in any acute distress. Skin: Skin color, texture, turgor normal. No rashes or lesions. HEENT/Neck: Essentially unremarkable  Lungs: Normal - CTA without rales, rhonchi, or wheezing. Heart: regular rate and rhythm, S1, S2 normal, no murmur, click, rub or gallop No S3 or S4. Abdomen: Non-obese soft, non-distended, non-tender, normal active bowel sounds, no masses palpated and no hepatosplenomegaly  Extremities: No clubbing, cyanosis, edema  Neurologic: cranial nerves II-XII are grossly intact. Tardive dyskinesia present. ASSESSMENT:    1. Essential hypertension     2. Mixed hyperlipidemia     3. Hypothyroidism due to acquired atrophy of thyroid     4. Chronic kidney disease, stage III (moderate)     5. SIADH (syndrome of inappropriate ADH production) ([facility] [address]     6.  Major depressive disorder with single episode, in remission

## 2017-12-31 ENCOUNTER — OUTSIDE SERVICES (OUTPATIENT)
Dept: INTERNAL MEDICINE | Age: 82
End: 2017-12-31
Payer: MEDICARE

## 2017-12-31 DIAGNOSIS — R05.9 COUGH: Primary | ICD-10-CM

## 2017-12-31 DIAGNOSIS — I10 ESSENTIAL HYPERTENSION: ICD-10-CM

## 2017-12-31 DIAGNOSIS — Z20.828 EXPOSURE TO INFLUENZA: ICD-10-CM

## 2017-12-31 PROCEDURE — 99308 SBSQ NF CARE LOW MDM 20: CPT | Performed by: NURSE PRACTITIONER

## 2017-12-31 NOTE — PROGRESS NOTES
12/14/17  Malathi Dies  5/30/1922      Chief Complaint  1. Cough    2. Exposure to influenza    3. Essential hypertension        HPI:  Seeing patient today at the request of the nursing staff. Patient has had a nonproductive cough and occasional wheezes worsening over the last few days. Director of nursing was concerned and asked me to see her. She has not had any fevers. No chills. Currently on Tamiflu prophylactic. Patient denies any body aches no fevers. Does have some increased fatigue however the nurse's aide states she normally sleeps after breakfast denies any productive cough. No body aches. Has had fatigue but the nursing staff states it is normal for her to go back to bed after breakfast.  Blood pressures have been stable. She denies any lightheadedness or dizziness. He had been utilizing when necessary nebulizers for wheezing.     Allergies   Allergen Reactions    Prednisone      GI upset    Quinapril Hcl      Cough    Vicodin [Hydrocodone-Acetaminophen]      Incontinence       Past Medical History:   Diagnosis Date    Altered mental status, unspecified 11/20/2015    Likely medication related-tricyclic antidepressant     Benign essential tremor     right hand    Chest pain 9/21/2010    Chronic kidney disease, stage III (moderate) 1/17/2017    Coronary artery disease involving native coronary artery of native heart without angina pectoris 1/17/2017    Dermatitis     possibly monilia, buttocks, improved    DJD (degenerative joint disease)     Gait apraxia     Mild    Hyperlipidemia     Hypertension     hypertensive urgency, 9/21/2010    Hypothyroidism     Iron deficiency anemia     Lower urinary tract infectious disease 4/8/2014     replace inactive diagnosis    Major depressive disorder with single episode, in remission ([facility] [address] 7/25/2013    Orthostatic hypotension 4/7/2014    Osteoarthritis     SIADH (syndrome of inappropriate ADH production) ([facility])     improved    Subdural hematoma [facility]) September 2009    drained    Symptoms involving urinary system     Postmenopausal, treated with Premarin cream in the past    Syncope, near 4/7/2014    Tardive dyskinesia 11/20/2015       Past Surgical History:   Procedure Laterality Date    APPENDECTOMY  1940    INTRACAPSULAR CATARACT EXTRACTION Bilateral 2000    SUBDURAL HEMORRHAGE DRAINAGE  September 2009    Status post    DINORAH AND BSO  5166    UMBILICAL HERNIA REPAIR         Current Outpatient Prescriptions on File Prior to Visit   Medication Sig Dispense Refill    mirtazapine (REMERON) 15 MG tablet Take 15 mg by mouth nightly      levothyroxine (SYNTHROID) 100 MCG tablet Take 100 mcg by mouth Daily      amoxapine (ASENDIN) 150 MG tablet Take 1 tablet by mouth nightly 30 tablet 0    ferrous sulfate 325 (65 FE) MG tablet Take 325 mg by mouth 2 times daily      docusate sodium (COLACE) 100 MG capsule Take 100 mg by mouth 2 times daily      acetaminophen (TYLENOL) 325 MG tablet Take 650 mg by mouth every 6 hours as needed for Fever      Handicap Placard MISC by Does not apply route. Expires in 3 years 1 each 0     No current facility-administered medications on file prior to visit. Social History     Social History    Marital status:      Spouse name: N/A    Number of children: N/A    Years of education: 15     Occupational History    Not on file. Social History Main Topics    Smoking status: Never Smoker    Smokeless tobacco: Never Used      Comment: sosa loco 3/30/15    Alcohol use No      Comment: occasionally    Drug use: No    Sexual activity: Not on file     Other Topics Concern    Not on file     Social History Narrative    Born in [place], worked as a  in the past       Review of Systems   Constitutional: Positive for malaise/fatigue. Negative for chills, diaphoresis and fever. HENT: Negative for congestion, ear discharge, ear pain, nosebleeds, sinus pain and sore throat.     Eyes: Negative for photophobia, pain, discharge and redness. Respiratory: Positive for cough. Negative for sputum production, shortness of breath and wheezing. Cardiovascular: Negative for chest pain, palpitations, orthopnea and leg swelling. Gastrointestinal: Negative for abdominal pain, blood in stool, constipation, diarrhea, nausea and vomiting. Genitourinary: Negative for dysuria, flank pain, frequency and urgency. Musculoskeletal: Negative for myalgias and neck pain. Skin: Negative for rash. Neurological: Negative for dizziness, tremors, seizures, weakness and headaches. Psychiatric/Behavioral: Negative for depression and memory loss. The patient is not nervous/anxious. Physical Exam   Constitutional: She is oriented to person, place, and time and well-developed, well-nourished, and in no distress. No distress. Fatigue   HENT:   Head: Normocephalic and atraumatic. Right Ear: External ear normal. No drainage, swelling or tenderness. Tympanic membrane is not perforated, not erythematous, not retracted and not bulging. Left Ear: External ear normal. No drainage, swelling or tenderness. Tympanic membrane is not perforated, not erythematous, not retracted and not bulging. Nose: No mucosal edema or rhinorrhea. Right sinus exhibits no maxillary sinus tenderness and no frontal sinus tenderness. Left sinus exhibits no maxillary sinus tenderness and no frontal sinus tenderness. Mouth/Throat: Uvula is midline and mucous membranes are normal. Mucous membranes are not pale and not dry. No uvula swelling. No oropharyngeal exudate, posterior oropharyngeal edema, posterior oropharyngeal erythema or tonsillar abscesses. Eyes: Right eye exhibits no discharge. Left eye exhibits no discharge. Neck: Neck supple. No tracheal deviation present. Cardiovascular: Normal rate, regular rhythm and normal heart sounds. Exam reveals no gallop and no friction rub. No murmur heard.   Pulmonary/Chest: Effort normal

## 2018-01-01 ENCOUNTER — HOSPITAL ENCOUNTER (OUTPATIENT)
Age: 83
Setting detail: SPECIMEN
Discharge: HOME | End: 2018-03-29
Payer: MEDICARE

## 2018-01-01 ENCOUNTER — TELEPHONE (OUTPATIENT)
Dept: INTERNAL MEDICINE | Age: 83
End: 2018-01-01

## 2018-01-01 ENCOUNTER — HOSPITAL ENCOUNTER (OUTPATIENT)
Age: 83
Setting detail: SPECIMEN
Discharge: HOME | End: 2018-08-14
Payer: MEDICARE

## 2018-01-01 ENCOUNTER — OUTSIDE SERVICES (OUTPATIENT)
Dept: INTERNAL MEDICINE | Age: 83
End: 2018-01-01
Payer: MEDICARE

## 2018-01-01 DIAGNOSIS — I25.10 CORONARY ARTERY DISEASE INVOLVING NATIVE CORONARY ARTERY OF NATIVE HEART WITHOUT ANGINA PECTORIS: ICD-10-CM

## 2018-01-01 DIAGNOSIS — E78.2 MIXED HYPERLIPIDEMIA: ICD-10-CM

## 2018-01-01 DIAGNOSIS — J01.90 ACUTE NON-RECURRENT SINUSITIS, UNSPECIFIED LOCATION: ICD-10-CM

## 2018-01-01 DIAGNOSIS — E03.4 HYPOTHYROIDISM DUE TO ACQUIRED ATROPHY OF THYROID: ICD-10-CM

## 2018-01-01 DIAGNOSIS — I10 ESSENTIAL HYPERTENSION: ICD-10-CM

## 2018-01-01 DIAGNOSIS — J18.9 PNEUMONIA OF RIGHT LOWER LOBE DUE TO INFECTIOUS ORGANISM: Primary | ICD-10-CM

## 2018-01-01 DIAGNOSIS — F03.90 DEMENTIA WITHOUT BEHAVIORAL DISTURBANCE, UNSPECIFIED DEMENTIA TYPE: ICD-10-CM

## 2018-01-01 DIAGNOSIS — G24.01 TARDIVE DYSKINESIA: ICD-10-CM

## 2018-01-01 DIAGNOSIS — I10 ESSENTIAL HYPERTENSION: Primary | ICD-10-CM

## 2018-01-01 DIAGNOSIS — E22.2 SIADH (SYNDROME OF INAPPROPRIATE ADH PRODUCTION) (HCC): Primary | ICD-10-CM

## 2018-01-01 DIAGNOSIS — F32.5 MAJOR DEPRESSIVE DISORDER WITH SINGLE EPISODE, IN REMISSION (HCC): ICD-10-CM

## 2018-01-01 DIAGNOSIS — N18.30 CHRONIC KIDNEY DISEASE, STAGE III (MODERATE) (HCC): ICD-10-CM

## 2018-01-01 DIAGNOSIS — R05.3 PERSISTENT COUGH: Primary | ICD-10-CM

## 2018-01-01 DIAGNOSIS — J02.9 PHARYNGITIS, UNSPECIFIED ETIOLOGY: Primary | ICD-10-CM

## 2018-01-01 DIAGNOSIS — G47.10 EXCESSIVE SLEEPINESS: Primary | ICD-10-CM

## 2018-01-01 DIAGNOSIS — R41.3 MEMORY LOSS: Primary | ICD-10-CM

## 2018-01-01 DIAGNOSIS — R63.4 WEIGHT LOSS: ICD-10-CM

## 2018-01-01 DIAGNOSIS — E22.2 SIADH (SYNDROME OF INAPPROPRIATE ADH PRODUCTION) (HCC): ICD-10-CM

## 2018-01-01 DIAGNOSIS — R63.5 WEIGHT GAIN: Primary | ICD-10-CM

## 2018-01-01 DIAGNOSIS — N30.00 ACUTE CYSTITIS WITHOUT HEMATURIA: Primary | ICD-10-CM

## 2018-01-01 DIAGNOSIS — R30.0 DYSURIA: Primary | ICD-10-CM

## 2018-01-01 DIAGNOSIS — I95.1 ORTHOSTATIC HYPOTENSION: ICD-10-CM

## 2018-01-01 LAB
-: ABNORMAL
ABSOLUTE EOS #: 0 K/UL (ref 0–0.4)
ABSOLUTE IMMATURE GRANULOCYTE: ABNORMAL K/UL (ref 0–0.3)
ABSOLUTE LYMPH #: 1.5 K/UL (ref 1–4.8)
ABSOLUTE MONO #: 0.4 K/UL (ref 0.1–1.2)
ALBUMIN SERPL-MCNC: 3.9 G/DL (ref 3.5–5.2)
ALBUMIN/GLOBULIN RATIO: 1.4 (ref 1–2.5)
ALP BLD-CCNC: 66 U/L (ref 35–104)
ALT SERPL-CCNC: 16 U/L (ref 5–33)
AMORPHOUS: ABNORMAL
ANION GAP SERPL CALCULATED.3IONS-SCNC: 12 MMOL/L (ref 9–17)
AST SERPL-CCNC: 22 U/L
BACTERIA: ABNORMAL
BASOPHILS # BLD: 1 % (ref 0–1)
BASOPHILS ABSOLUTE: 0 K/UL (ref 0–0.2)
BILIRUB SERPL-MCNC: 0.27 MG/DL (ref 0.3–1.2)
BILIRUBIN URINE: NEGATIVE
BUN BLDV-MCNC: 39 MG/DL (ref 8–23)
BUN/CREAT BLD: 45 (ref 9–20)
CALCIUM SERPL-MCNC: 9.3 MG/DL (ref 8.6–10.4)
CASTS UA: ABNORMAL /LPF (ref 0–2)
CHLORIDE BLD-SCNC: 106 MMOL/L (ref 98–107)
CO2: 27 MMOL/L (ref 20–31)
COLOR: ABNORMAL
COMMENT UA: ABNORMAL
CREAT SERPL-MCNC: 0.87 MG/DL (ref 0.5–0.9)
CRYSTALS, UA: ABNORMAL /HPF
CULTURE: ABNORMAL
DIFFERENTIAL TYPE: ABNORMAL
EOSINOPHILS RELATIVE PERCENT: 1 % (ref 1–7)
EPITHELIAL CELLS UA: ABNORMAL /HPF (ref 0–5)
GFR AFRICAN AMERICAN: >60 ML/MIN
GFR NON-AFRICAN AMERICAN: >60 ML/MIN
GFR SERPL CREATININE-BSD FRML MDRD: ABNORMAL ML/MIN/{1.73_M2}
GFR SERPL CREATININE-BSD FRML MDRD: ABNORMAL ML/MIN/{1.73_M2}
GLUCOSE BLD-MCNC: 92 MG/DL (ref 70–99)
GLUCOSE URINE: NEGATIVE
HCT VFR BLD CALC: 35.2 % (ref 36–46)
HEMOGLOBIN: 11.5 G/DL (ref 12–16)
IMMATURE GRANULOCYTES: ABNORMAL %
KETONES, URINE: NEGATIVE
LEUKOCYTE ESTERASE, URINE: ABNORMAL
LYMPHOCYTES # BLD: 54 % (ref 16–46)
Lab: ABNORMAL
MCH RBC QN AUTO: 29.4 PG (ref 26–34)
MCHC RBC AUTO-ENTMCNC: 32.8 G/DL (ref 31–37)
MCV RBC AUTO: 89.9 FL (ref 80–100)
MONOCYTES # BLD: 15 % (ref 4–11)
MUCUS: ABNORMAL
NITRITE, URINE: NEGATIVE
NRBC AUTOMATED: ABNORMAL PER 100 WBC
ORGANISM: ABNORMAL
OTHER OBSERVATIONS UA: ABNORMAL
PDW BLD-RTO: 12.9 % (ref 11–14.5)
PH UA: 6.5 (ref 5–6)
PLATELET # BLD: 107 K/UL (ref 140–450)
PLATELET ESTIMATE: ABNORMAL
PMV BLD AUTO: 9.1 FL (ref 6–12)
POTASSIUM SERPL-SCNC: 4.2 MMOL/L (ref 3.7–5.3)
PROTEIN UA: ABNORMAL
RBC # BLD: 3.91 M/UL (ref 4–5.2)
RBC # BLD: ABNORMAL 10*6/UL
RBC UA: ABNORMAL /HPF (ref 0–4)
RENAL EPITHELIAL, UA: ABNORMAL /HPF
SEG NEUTROPHILS: 29 % (ref 43–77)
SEGMENTED NEUTROPHILS ABSOLUTE COUNT: 0.8 K/UL (ref 1.8–7.7)
SODIUM BLD-SCNC: 145 MMOL/L (ref 135–144)
SPECIFIC GRAVITY UA: 1.01 (ref 1.01–1.02)
SPECIMEN DESCRIPTION: ABNORMAL
STATUS: ABNORMAL
THYROXINE, FREE: 1.21 NG/DL (ref 0.93–1.7)
TOTAL PROTEIN: 6.7 G/DL (ref 6.4–8.3)
TRICHOMONAS: ABNORMAL
TSH SERPL DL<=0.05 MIU/L-ACNC: 1.33 MIU/L (ref 0.3–5)
TURBIDITY: ABNORMAL
URINE HGB: ABNORMAL
UROBILINOGEN, URINE: NORMAL
WBC # BLD: 2.7 K/UL (ref 3.5–11)
WBC # BLD: ABNORMAL 10*3/UL
WBC UA: ABNORMAL /HPF (ref 0–4)
YEAST: ABNORMAL

## 2018-01-01 PROCEDURE — 99307 SBSQ NF CARE SF MDM 10: CPT | Performed by: INTERNAL MEDICINE

## 2018-01-01 PROCEDURE — 99308 SBSQ NF CARE LOW MDM 20: CPT | Performed by: NURSE PRACTITIONER

## 2018-01-01 PROCEDURE — 87088 URINE BACTERIA CULTURE: CPT

## 2018-01-01 PROCEDURE — 81001 URINALYSIS AUTO W/SCOPE: CPT

## 2018-01-01 PROCEDURE — 36415 COLL VENOUS BLD VENIPUNCTURE: CPT

## 2018-01-01 PROCEDURE — 87186 SC STD MICRODIL/AGAR DIL: CPT

## 2018-01-01 PROCEDURE — 99308 SBSQ NF CARE LOW MDM 20: CPT | Performed by: INTERNAL MEDICINE

## 2018-01-01 PROCEDURE — 87086 URINE CULTURE/COLONY COUNT: CPT

## 2018-01-01 PROCEDURE — 84439 ASSAY OF FREE THYROXINE: CPT

## 2018-01-01 PROCEDURE — 84443 ASSAY THYROID STIM HORMONE: CPT

## 2018-01-01 PROCEDURE — 80053 COMPREHEN METABOLIC PANEL: CPT

## 2018-01-01 PROCEDURE — 85025 COMPLETE CBC W/AUTO DIFF WBC: CPT

## 2018-01-01 RX ORDER — CEPHALEXIN 500 MG/1
500 CAPSULE ORAL 2 TIMES DAILY
Qty: 10 CAPSULE | Refills: 0 | Status: SHIPPED | OUTPATIENT
Start: 2018-01-01 | End: 2018-01-01

## 2018-01-01 RX ORDER — CIPROFLOXACIN 250 MG/1
250 TABLET, FILM COATED ORAL 2 TIMES DAILY
Qty: 10 TABLET | Refills: 0 | Status: SHIPPED | OUTPATIENT
Start: 2018-01-01 | End: 2018-01-01 | Stop reason: ALTCHOICE

## 2018-01-01 ASSESSMENT — ENCOUNTER SYMPTOMS
COUGH: 1
SORE THROAT: 0
CONSTIPATION: 0
SPUTUM PRODUCTION: 0
WHEEZING: 0
VOMITING: 0
NAUSEA: 0
EYE DISCHARGE: 0
EYE PAIN: 0
EYE REDNESS: 0
CONSTIPATION: 0
BLOOD IN STOOL: 0
EYE REDNESS: 0
ORTHOPNEA: 0
ORTHOPNEA: 0
SHORTNESS OF BREATH: 0
DIARRHEA: 0
WHEEZING: 0
DIARRHEA: 0
BLOOD IN STOOL: 0
EYE DISCHARGE: 0
DIARRHEA: 0
WHEEZING: 0
ABDOMINAL PAIN: 0
SORE THROAT: 0
VOMITING: 0
BLOOD IN STOOL: 0
HEARTBURN: 0
EYE PAIN: 0
CONSTIPATION: 0
COUGH: 1
COUGH: 0
NAUSEA: 0
ABDOMINAL PAIN: 0
SHORTNESS OF BREATH: 0
SHORTNESS OF BREATH: 0
SORE THROAT: 0
NAUSEA: 0
VOMITING: 0
EYE REDNESS: 0
ABDOMINAL PAIN: 0
HEMOPTYSIS: 0
SPUTUM PRODUCTION: 1

## 2018-01-16 ENCOUNTER — OUTSIDE SERVICES (OUTPATIENT)
Dept: INTERNAL MEDICINE | Age: 83
End: 2018-01-16
Payer: MEDICARE

## 2018-01-16 DIAGNOSIS — E03.4 HYPOTHYROIDISM DUE TO ACQUIRED ATROPHY OF THYROID: ICD-10-CM

## 2018-01-16 DIAGNOSIS — E78.2 MIXED HYPERLIPIDEMIA: ICD-10-CM

## 2018-01-16 DIAGNOSIS — I10 ESSENTIAL HYPERTENSION: Primary | ICD-10-CM

## 2018-01-16 DIAGNOSIS — F32.5 MAJOR DEPRESSIVE DISORDER WITH SINGLE EPISODE, IN REMISSION (HCC): ICD-10-CM

## 2018-01-16 DIAGNOSIS — E22.2 SIADH (SYNDROME OF INAPPROPRIATE ADH PRODUCTION) (HCC): ICD-10-CM

## 2018-01-16 DIAGNOSIS — G24.01 TARDIVE DYSKINESIA: ICD-10-CM

## 2018-01-16 DIAGNOSIS — N18.30 CHRONIC KIDNEY DISEASE, STAGE III (MODERATE) (HCC): ICD-10-CM

## 2018-01-16 PROCEDURE — 99307 SBSQ NF CARE SF MDM 10: CPT | Performed by: INTERNAL MEDICINE

## 2018-01-17 ENCOUNTER — TELEPHONE (OUTPATIENT)
Dept: INTERNAL MEDICINE | Age: 83
End: 2018-01-17

## 2018-01-17 NOTE — PROGRESS NOTES
DR. Anila Chowdhury - [address] [facility] VISIT    DATE OF SERVICE: 1/16/18    NURSING HOME: [facility]    CHIEF COMPLAINT/HISTORY OF CHIEF COMPLAINT: This patient is being seen for ongoing evaluation and management of her hypertension, hyperlipidemia, hypothyroidism, depression, chronic kidney disease, syndrome of inappropriate ADH production, and tardive dyskinesia. There are no new complaints at this time. ALLERGIES:   Allergies   Allergen Reactions    Prednisone      GI upset    Quinapril Hcl      Cough    Vicodin [Hydrocodone-Acetaminophen]      Incontinence       MEDICATIONS: As noted on the [facility] MAR, referenced and incorporated herein.     PAST MEDICAL HISTORY:   Past Medical History:   Diagnosis Date    Altered mental status, unspecified 11/20/2015    Likely medication related-tricyclic antidepressant     Benign essential tremor     right hand    Chest pain 9/21/2010    Chronic kidney disease, stage III (moderate) 1/17/2017    Coronary artery disease involving native coronary artery of native heart without angina pectoris 1/17/2017    Dermatitis     possibly monilia, buttocks, improved    DJD (degenerative joint disease)     Gait apraxia     Mild    Hyperlipidemia     Hypertension     hypertensive urgency, 9/21/2010    Hypothyroidism     Iron deficiency anemia     Lower urinary tract infectious disease 4/8/2014     replace inactive diagnosis    Major depressive disorder with single episode, in remission ([address] 7/25/2013    Orthostatic hypotension 4/7/2014    Osteoarthritis     SIADH (syndrome of inappropriate ADH production) ([address]     improved    Subdural hematoma ([address] September 2009    drained    Symptoms involving urinary system     Postmenopausal, treated with Premarin cream in the past    Syncope, near 4/7/2014    Tardive dyskinesia 11/20/2015       PAST SURGICAL HISTORY:   Past Surgical History:   Procedure Laterality Date   Julita Mandel updates summarized and entered into electronic record  3. See nursing home orders and MAR.       Electronically signed by Jacqui Forte DO on 1/16/2018 at 8:51 PM  Internal Medicine

## 2018-01-22 ENCOUNTER — OUTSIDE SERVICES (OUTPATIENT)
Dept: INTERNAL MEDICINE | Age: 83
End: 2018-01-22
Payer: MEDICARE

## 2018-01-22 DIAGNOSIS — H92.01 RIGHT EAR PAIN: Primary | ICD-10-CM

## 2018-01-22 PROCEDURE — 99307 SBSQ NF CARE SF MDM 10: CPT | Performed by: NURSE PRACTITIONER

## 2018-01-22 NOTE — PROGRESS NOTES
exhibits no edema. Neurological: She is alert and oriented to person, place, and time. No cranial nerve deficit. Gait normal. Coordination normal.   Skin: Skin is warm and dry. No rash noted. She is not diaphoretic. Psychiatric: Mood, memory, affect and judgment normal.   Nursing note and vitals reviewed. Vital signs as per [facility] Care Chart at [facility]/ Reviewed     Assessment:  1. Right ear pain  No acute complaints/ findings today- have tried to irrigate canals in past with no success- suggested at that time an ENT eval- pt declines at present       Plan:  As noted above. Follow up for routine visit. Call sooner with concerns prior.     Electronically signed by Ricco Ghotra CNP on 1/22/2018 at 7:39 AM

## 2018-01-29 PROCEDURE — 99308 SBSQ NF CARE LOW MDM 20: CPT | Performed by: NURSE PRACTITIONER

## 2018-01-29 ASSESSMENT — ENCOUNTER SYMPTOMS
SHORTNESS OF BREATH: 0
VOMITING: 0
BLOOD IN STOOL: 0
EYE REDNESS: 0
ORTHOPNEA: 0
COUGH: 0
WHEEZING: 0
CONSTIPATION: 0
NAUSEA: 0
SORE THROAT: 0
DIARRHEA: 0
ABDOMINAL PAIN: 0
EYE DISCHARGE: 0
EYE PAIN: 0

## 2018-01-30 PROCEDURE — 99308 SBSQ NF CARE LOW MDM 20: CPT | Performed by: NURSE PRACTITIONER

## 2018-02-05 NOTE — PROGRESS NOTES
01/29/18  Susana Young  5/30/1922    Patient Resident of [facility]    Chief Complaint: persist cough     HPI:  77-year-old female patient with ongoing persistent cough, nonproductive worsening over the last few weeks. Nursing staff states family requested an evaluation. She denies any shortness of breath. No fevers or chills. Vitals have been stable. Continues to eat and drink without difficulty. No excessive body aches. No excessive fatigue. Continues to move around the nursing home in her wheelchair without difficulty. No nasal drainage.     Allergies   Allergen Reactions    Prednisone      GI upset    Quinapril Hcl      Cough    Vicodin [Hydrocodone-Acetaminophen]      Incontinence       Past Medical History:   Diagnosis Date    Altered mental status, unspecified 11/20/2015    Likely medication related-tricyclic antidepressant     Benign essential tremor     right hand    Chest pain 9/21/2010    Chronic kidney disease, stage III (moderate) 1/17/2017    Coronary artery disease involving native coronary artery of native heart without angina pectoris 1/17/2017    Dermatitis     possibly monilia, buttocks, improved    DJD (degenerative joint disease)     Gait apraxia     Mild    Hyperlipidemia     Hypertension     hypertensive urgency, 9/21/2010    Hypothyroidism     Iron deficiency anemia     Lower urinary tract infectious disease 4/8/2014     replace inactive diagnosis    Major depressive disorder with single episode, in remission ([address] 7/25/2013    Orthostatic hypotension 4/7/2014    Osteoarthritis     SIADH (syndrome of inappropriate ADH production) ([address]     improved    Subdural hematoma ([address] September 2009    drained    Symptoms involving urinary system     Postmenopausal, treated with Premarin cream in the past    Syncope, near 4/7/2014    Tardive dyskinesia 11/20/2015       Past Surgical History:   Procedure Laterality Date   [facility] CATARACT EXTRACTION Bilateral 2000    SUBDURAL HEMORRHAGE DRAINAGE  September 2009    Status post    DINORAH AND BSO  9553    UMBILICAL HERNIA REPAIR         Medications as per [facility] Care Chart /reviewed     Social History     Social History    Marital status:      Spouse name: N/A    Number of children: N/A    Years of education: 15     Occupational History    Not on file. Social History Main Topics    Smoking status: Never Smoker    Smokeless tobacco: Never Used      Comment: sosa loco 3/30/15    Alcohol use No      Comment: occasionally    Drug use: No    Sexual activity: Not on file     Other Topics Concern    Not on file     Social History Narrative    Born in [facility], worked as a  in the past       Review of Systems   Constitutional: Negative for chills and fever. HENT: Negative for sore throat. Eyes: Negative for pain, discharge and redness. Respiratory: Positive for cough. Negative for hemoptysis, sputum production, shortness of breath and wheezing. Cardiovascular: Negative for chest pain, palpitations, orthopnea and leg swelling. Gastrointestinal: Negative for abdominal pain, blood in stool, constipation, diarrhea, nausea and vomiting. Genitourinary: Negative for dysuria and urgency. Musculoskeletal: Negative for myalgias and neck pain. Skin: Negative for rash. Neurological: Negative for dizziness, tremors, seizures and headaches. Psychiatric/Behavioral: Negative for depression and memory loss. The patient is not nervous/anxious. Physical Exam   Constitutional: She is oriented to person, place, and time and well-developed, well-nourished, and in no distress. No distress. HENT:   Head: Normocephalic and atraumatic. Right Ear: External ear normal. No drainage, swelling or tenderness. Left Ear: External ear normal. No drainage, swelling or tenderness. Nose: No mucosal edema or rhinorrhea.  Right sinus exhibits no maxillary sinus tenderness and no frontal sinus tenderness. Left sinus exhibits no maxillary sinus tenderness and no frontal sinus tenderness. Mouth/Throat: Uvula is midline and mucous membranes are normal. Mucous membranes are not pale and not dry. No uvula swelling. No oropharyngeal exudate, posterior oropharyngeal edema, posterior oropharyngeal erythema or tonsillar abscesses. Eyes: Right eye exhibits no discharge. Left eye exhibits no discharge. Neck: Neck supple. No tracheal deviation present. Cardiovascular: Normal rate, regular rhythm and normal heart sounds. Exam reveals no gallop and no friction rub. No murmur heard. Pulmonary/Chest: Effort normal and breath sounds normal. No respiratory distress. She has no wheezes. She has no rales. She exhibits no tenderness. Diminished bilateral bases   Abdominal: Soft. Bowel sounds are normal. She exhibits no distension. There is no tenderness. Musculoskeletal: She exhibits no edema. Neurological: She is alert and oriented to person, place, and time. No cranial nerve deficit. Gait normal. Coordination normal.   Skin: Skin is warm and dry. No rash noted. She is not diaphoretic. Psychiatric: Mood, memory, affect and judgment normal.   Nursing note and vitals reviewed. Vital Signs: Temperature 97.6°F, blood pressure 122/74, pulse 72, respirations 18, SPO2 96% on room air    Assessment:  1. Persistent cough  Chest x-ray today. Call results once obtained. Use Robitussin 1 teaspoon every 6 hours when necessary for cough. 2. Essential hypertension  Stable      Plan:  As noted above. Follow up for routine visit. Call sooner with concerns prior.     Electronically signed by Katie Hernadez CNP on 2/4/2018 at 9:31 PM

## 2018-02-15 NOTE — PROGRESS NOTES
01/30/18  Semaj Beth  5/30/1922    Patient Resident of [facility]    Chief Complaint  1. Pneumonia of right lower lobe due to infectious organism [facility])        HPI:  Following up on patient with productive cough. Patient underwent chest x-ray showed right lower lobe infiltrate. Has not had any fevers. No chills. Does continue to propel herself around the nursing home in her wheelchair. Denies any shortness of breath. Vitals have been stable. Nursing staff denying any acute concerns. Continues to eat and drink without difficulty.     Allergies   Allergen Reactions    Prednisone      GI upset    Quinapril Hcl      Cough    Vicodin [Hydrocodone-Acetaminophen]      Incontinence       Past Medical History:   Diagnosis Date    Altered mental status, unspecified 11/20/2015    Likely medication related-tricyclic antidepressant     Benign essential tremor     right hand    Chest pain 9/21/2010    Chronic kidney disease, stage III (moderate) 1/17/2017    Coronary artery disease involving native coronary artery of native heart without angina pectoris 1/17/2017    Dermatitis     possibly monilia, buttocks, improved    DJD (degenerative joint disease)     Gait apraxia     Mild    Hyperlipidemia     Hypertension     hypertensive urgency, 9/21/2010    Hypothyroidism     Iron deficiency anemia     Lower urinary tract infectious disease 4/8/2014     replace inactive diagnosis    Major depressive disorder with single episode, in remission ([address] 7/25/2013    Orthostatic hypotension 4/7/2014    Osteoarthritis     SIADH (syndrome of inappropriate ADH production) ([address]     improved    Subdural hematoma ([address] September 2009    drained    Symptoms involving urinary system     Postmenopausal, treated with Premarin cream in the past    Syncope, near 4/7/2014    Tardive dyskinesia 11/20/2015       Past Surgical History:   Procedure Laterality Date    APPENDECTOMY  1940    INTRACAPSULAR CATARACT and normal heart sounds. Pulmonary/Chest: Effort normal and breath sounds normal. No respiratory distress. She has no wheezes. She has no rales. She exhibits no tenderness. Abdominal: Soft. Bowel sounds are normal.   Musculoskeletal: She exhibits no edema. Neurological: She is alert and oriented to person, place, and time. No cranial nerve deficit. Coordination normal.   Skin: Skin is warm and dry. No rash noted. She is not diaphoretic. Psychiatric: Mood, affect and judgment normal.   Nursing note and vitals reviewed. Vital Signs: Stable as per her chart at the [facility]/review    Assessment:  1. Pneumonia of right lower lobe due to infectious organism ([facility] [address]  Zithromax, monitor lung sounds, monitor vitals. Robitussin as needed for cough. Plan:  As noted above. Follow up for routine visit. Call sooner with concerns prior.     Electronically signed by Valene Kawasaki, CNP on 2/14/2018 at 10:10 PM

## 2018-08-13 NOTE — PROGRESS NOTES
BSO  [id number]    UMBILICAL HERNIA REPAIR         Medications as per [facility] Care Chart /reviewed     Social History     Social History    Marital status:      Spouse name: N/A    Number of children: N/A    Years of education: 15     Occupational History    Not on file. Social History Main Topics    Smoking status: Never Smoker    Smokeless tobacco: Never Used      Comment: sosa loco 3/30/15    Alcohol use No      Comment: occasionally    Drug use: No    Sexual activity: Not on file     Other Topics Concern    Not on file     Social History Narrative    Born in [facility], worked as a  in the past       Review of Systems   Constitutional: Negative for chills, diaphoresis, fever and malaise/fatigue. Genitourinary: Positive for dysuria and urgency. Negative for flank pain, frequency and hematuria. Neurological: Negative for weakness. All other systems reviewed and are negative. Physical Exam   Constitutional: She is oriented to person, place, and time and well-developed, well-nourished, and in no distress. No distress. HENT:   Head: Normocephalic and atraumatic. Right Ear: External ear normal.   Left Ear: External ear normal.   Eyes: Right eye exhibits no discharge. Left eye exhibits no discharge. Neck: Neck supple. No tracheal deviation present. Cardiovascular: Normal rate and regular rhythm. Pulmonary/Chest: Effort normal and breath sounds normal. No respiratory distress. Abdominal: Soft. Bowel sounds are normal. She exhibits no distension. There is no tenderness. There is no rebound. Musculoskeletal: She exhibits no edema. Neurological: She is alert and oriented to person, place, and time. No cranial nerve deficit. Gait normal. Coordination normal.   Skin: Skin is warm and dry. No rash noted. She is not diaphoretic. Psychiatric: Mood, memory, affect and judgment normal.   Nursing note and vitals reviewed.       Vital Signs: Temperature Afebrile, vital signs

## 2018-08-28 NOTE — PROGRESS NOTES
discharge. Neck: Neck supple. No tracheal deviation present. Cardiovascular: Normal rate, regular rhythm and normal heart sounds. Exam reveals no gallop and no friction rub. No murmur heard. Pulmonary/Chest: Effort normal and breath sounds normal. No respiratory distress. She has no wheezes. She has no rales. She exhibits no tenderness. Abdominal: Soft. Bowel sounds are normal. She exhibits no distension. There is no tenderness. Musculoskeletal: She exhibits no edema. Lymphadenopathy:     She has no cervical adenopathy (No lymphadenopathy appreciated). Neurological: She is alert and oriented to person, place, and time. No cranial nerve deficit. Gait normal. Coordination normal.   Skin: Skin is warm and dry. No rash noted. She is not diaphoretic. Psychiatric: Mood, memory, affect and judgment normal.   Nursing note and vitals reviewed. Vital Signs: Temperature 99.3°F, blood pressure 139/73, pulse 92, respirations 16, SPO2 96% on room air    Assessment:  1. Pharyngitis, unspecified etiology      2.  Acute non-recurrent sinusitis, unspecified location        Plan:    ATB as directed and until completed  Increase fluids  Increase rest  Tylenol as needed for general aches and pain  Robitussin as needed for cough  FU if worsens/persist         Electronically signed by PATRICIA Howell CNP on 8/27/2018 at 9:16 PM

## 2018-08-30 NOTE — PROGRESS NOTES
08/30/18  Frances Armstrong  5/30/1922     Patient Resident of [facility]     Chief Complaint: Review of chronic health conditions for routine monthly rounds at the [facility]     1. Memory loss    2. Essential hypertension    3. Orthostatic hypotension    4. Hypothyroidism due to acquired atrophy of thyroid    5. Major depressive disorder with single episode, in remission ([facility] [address]    6. Chronic kidney disease, stage III (moderate)          HPI:  80year-old patient with chronic health conditions as noted above being seen for routine monthly rounds. Overall patient has been stable. Slight ongoing cognitive decline. Continues to eat and drink without difficulty. For the most part is propelled through the nursing home in her wheelchair by an additional resident however is able to propel herself. Today is found down at beauty shop getting her hair done. No problems with bowel or bladder. Currently is on amoxicillin for pharyngitis which appears to be improving. No fevers. No chills.    Nursing staff denies any nursing service issues.           Allergies   Allergen Reactions    Prednisone         GI upset    Quinapril Hcl         Cough    Vicodin [Hydrocodone-Acetaminophen]         Incontinence         Past Medical History   Past Medical History:   Diagnosis Date    Altered mental status, unspecified 11/20/2015     Likely medication related-tricyclic antidepressant     Benign essential tremor       right hand    Chest pain 9/21/2010    Chronic kidney disease, stage III (moderate) 1/17/2017    Coronary artery disease involving native coronary artery of native heart without angina pectoris 1/17/2017    Dermatitis       possibly monilia, buttocks, improved    DJD (degenerative joint disease)      Gait apraxia       Mild    Hyperlipidemia      Hypertension       hypertensive urgency, 9/21/2010    Hypothyroidism      Iron deficiency anemia      Lower urinary tract infectious disease 4/8/2014    replace inactive diagnosis    Major depressive disorder with single episode, in remission ([facility] [address] 7/25/2013    Orthostatic hypotension 4/7/2014    Osteoarthritis      SIADH (syndrome of inappropriate ADH production) ([facility] [address]       improved    Subdural hematoma [facility]) September 2009     drained    Symptoms involving urinary system       Postmenopausal, treated with Premarin cream in the past    Syncope, near 4/7/2014    Tardive dyskinesia 11/20/2015            Past Surgical History         Past Surgical History:   Procedure Laterality Date    APPENDECTOMY   1940    INTRACAPSULAR CATARACT EXTRACTION Bilateral 2000    SUBDURAL HEMORRHAGE DRAINAGE   September 2009     Status post    DINORAH AND BSO   0983    UMBILICAL HERNIA REPAIR                Medications as per [facility] /reviewed      Social History   Social History            Social History    Marital status:        Spouse name: N/A    Number of children: N/A    Years of education: 15          Occupational History    Not on file.             Social History Main Topics    Smoking status: Never Smoker    Smokeless tobacco: Never Used         Comment: sosa loco 3/30/15    Alcohol use No         Comment: occasionally    Drug use: No    Sexual activity: Not on file           Other Topics Concern    Not on file          Social History Narrative     Born in [place], worked as a  in the past            Review of Systems   Constitutional: Negative for chills and fever. HENT: Negative for ear discharge, nosebleeds and sore throat. Eyes: Negative for pain, discharge and redness. Respiratory: Negative for cough, shortness of breath and wheezing. Cardiovascular: Negative for chest pain, palpitations, orthopnea and leg swelling. Gastrointestinal: Negative for abdominal pain, blood in stool, constipation, diarrhea, nausea and vomiting. Genitourinary: Negative for dysuria and urgency.    Musculoskeletal: Negative for

## 2018-09-23 NOTE — PROGRESS NOTES
DR. Soo Burleson - [facility] VISIT    DATE OF SERVICE: 4/24/18    NURSING HOME: [facility]    CHIEF COMPLAINT/HISTORY OF CHIEF COMPLAINT: This patient is being seen for ongoing evaluation and management of her hypertension, hyperlipidemia, hypothyroidism, depression, chronic kidney disease, syndrome of inappropriate ADH production, and tardive dyskinesia. There are no new complaints at this time. ALLERGIES:   Allergies   Allergen Reactions    Prednisone      GI upset    Quinapril Hcl      Cough    Vicodin [Hydrocodone-Acetaminophen]      Incontinence       MEDICATIONS: As noted on the [facility], referenced and incorporated herein.     PAST MEDICAL HISTORY:   Past Medical History:   Diagnosis Date    Altered mental status, unspecified 11/20/2015    Likely medication related-tricyclic antidepressant     Benign essential tremor     right hand    Chest pain 9/21/2010    Chronic kidney disease, stage III (moderate) 1/17/2017    Coronary artery disease involving native coronary artery of native heart without angina pectoris 1/17/2017    Dermatitis     possibly monilia, buttocks, improved    DJD (degenerative joint disease)     Gait apraxia     Mild    Hyperlipidemia     Hypertension     hypertensive urgency, 9/21/2010    Hypothyroidism     Iron deficiency anemia     Lower urinary tract infectious disease 4/8/2014     replace inactive diagnosis    Major depressive disorder with single episode, in remission ([address] 7/25/2013    Orthostatic hypotension 4/7/2014    Osteoarthritis     SIADH (syndrome of inappropriate ADH production) ([address]     improved    Subdural hematoma ([address] September 2009    drained    Symptoms involving urinary system     Postmenopausal, treated with Premarin cream in the past    Syncope, near 4/7/2014    Tardive dyskinesia 11/20/2015       PAST SURGICAL HISTORY:   Past Surgical History:   Procedure Laterality Date   Julita Mandel INTRACAPSULAR CATARACT EXTRACTION Bilateral 2000    SUBDURAL HEMORRHAGE DRAINAGE  September 2009    Status post    DINORAH AND BSO  0955    UMBILICAL HERNIA REPAIR         SOCIAL HISTORY:     Tobacco:   History   Smoking Status    Never Smoker   Smokeless Tobacco    Never Used     Comment: sosa loco 3/30/15     Alcohol:   History   Alcohol Use No     Comment: occasionally     Drugs:   History   Drug Use No       FAMILY HISTORY: family history includes Heart Disease in her father; Other in her mother. REVIEW OF SYSTEMS:     Please see history of chief complaint above; otherwise no new problems with respect to General, HEENT, Cardiovascular, Respiratory, Gastrointestinal, Genitourinary, Endocrinologic, Musculoskeletal, or Neuropsychiatric complaints. PHYSICAL EXAMINATION:    Vitals: Temp: 98.8 deg F. Pulse: 71. Resp: 16. BP: 148/71. General: A 89 y.o.  female. Alert and oriented to person, place and questionably oriented to time. She  does not appear to be in any acute distress. Skin: Skin color, texture, turgor normal. No rashes or lesions. HEENT/Neck: Essentially unremarkable  Lungs: Normal - CTA without rales, rhonchi, or wheezing. Heart: regular rate and rhythm, S1, S2 normal, no murmur, click, rub or gallop No S3 or S4. Abdomen: Non-obese soft, non-distended, non-tender, normal active bowel sounds, no masses palpated and no hepatosplenomegaly  Extremities: No clubbing, cyanosis, edema  Neurologic: cranial nerves II-XII are grossly intact. Tardive dyskinesia present. ASSESSMENT:     Diagnosis Orders   1. SIADH (syndrome of inappropriate ADH production) ([facility] [address]     2. Essential hypertension     3. Mixed hyperlipidemia     4. Hypothyroidism due to acquired atrophy of thyroid     5. Chronic kidney disease, stage III (moderate)     6. Coronary artery disease involving native coronary artery of native heart without angina pectoris     7.  Major depressive disorder with single episode, in

## 2018-10-07 PROBLEM — F03.90 DEMENTIA WITHOUT BEHAVIORAL DISTURBANCE (HCC): Status: ACTIVE | Noted: 2018-01-01

## 2018-11-09 NOTE — PROGRESS NOTES
11/05/18  Miguelrijennifer Elizabeth  5/30/1922    Patient Resident of [facility]    Chief Complaint  1. Weight gain    2. Essential hypertension    3. Dementia without behavioral disturbance, unspecified dementia type        HPI:  80year-old patient with history of dementia being seeing at the request of the nursing staff due to increased weight gain of 5 pounds in the last week. Nursing staff state. No shortness of breath. Vitals have remained stable. Patient denies any chest discomfort. No shortness of breath. Continues to eat and drink without difficulty. Her reviewed. Noted to have persistent elevations in blood pressure. Currently not on any medication. Patient denies any chest pain. No shortness of breath. No headaches or dizziness. Patient is a DO NOT RESUSCITATE [employer].     Allergies   Allergen Reactions    Prednisone      GI upset    Quinapril Hcl      Cough    Vicodin [Hydrocodone-Acetaminophen]      Incontinence       Past Medical History:   Diagnosis Date    Altered mental status, unspecified 11/20/2015    Likely medication related-tricyclic antidepressant     Benign essential tremor     right hand    Chest pain 9/21/2010    Chronic kidney disease, stage III (moderate) 1/17/2017    Coronary artery disease involving native coronary artery of native heart without angina pectoris 1/17/2017    Dermatitis     possibly monilia, buttocks, improved    DJD (degenerative joint disease)     Gait apraxia     Mild    Hyperlipidemia     Hypertension     hypertensive urgency, 9/21/2010    Hypothyroidism     Iron deficiency anemia     Lower urinary tract infectious disease 4/8/2014     replace inactive diagnosis    Major depressive disorder with single episode, in remission ([address] 7/25/2013    Orthostatic hypotension 4/7/2014    Osteoarthritis     SIADH (syndrome of inappropriate ADH production) ([facility] [address]     improved    Subdural hematoma [facility]) September 2009    drained    Symptoms involving

## 2019-01-01 ENCOUNTER — TELEPHONE (OUTPATIENT)
Dept: INTERNAL MEDICINE | Age: 84
End: 2019-01-01

## 2019-01-01 ENCOUNTER — OUTSIDE SERVICES (OUTPATIENT)
Dept: INTERNAL MEDICINE | Age: 84
End: 2019-01-01
Payer: MEDICARE

## 2019-01-01 DIAGNOSIS — I25.10 CORONARY ARTERY DISEASE INVOLVING NATIVE CORONARY ARTERY OF NATIVE HEART WITHOUT ANGINA PECTORIS: ICD-10-CM

## 2019-01-01 DIAGNOSIS — N18.30 CHRONIC KIDNEY DISEASE, STAGE III (MODERATE) (HCC): ICD-10-CM

## 2019-01-01 DIAGNOSIS — R41.0 CONFUSION: Primary | ICD-10-CM

## 2019-01-01 DIAGNOSIS — G24.01 TARDIVE DYSKINESIA: ICD-10-CM

## 2019-01-01 DIAGNOSIS — E78.2 MIXED HYPERLIPIDEMIA: ICD-10-CM

## 2019-01-01 DIAGNOSIS — I10 ESSENTIAL HYPERTENSION: ICD-10-CM

## 2019-01-01 DIAGNOSIS — E03.4 HYPOTHYROIDISM DUE TO ACQUIRED ATROPHY OF THYROID: ICD-10-CM

## 2019-01-01 DIAGNOSIS — F03.90 DEMENTIA WITHOUT BEHAVIORAL DISTURBANCE, UNSPECIFIED DEMENTIA TYPE: ICD-10-CM

## 2019-01-01 DIAGNOSIS — E22.2 SIADH (SYNDROME OF INAPPROPRIATE ADH PRODUCTION) (HCC): ICD-10-CM

## 2019-01-01 DIAGNOSIS — F32.5 MAJOR DEPRESSIVE DISORDER WITH SINGLE EPISODE, IN REMISSION (HCC): ICD-10-CM

## 2019-01-01 PROCEDURE — 99309 SBSQ NF CARE MODERATE MDM 30: CPT | Performed by: NURSE PRACTITIONER

## 2019-01-01 PROCEDURE — 99308 SBSQ NF CARE LOW MDM 20: CPT | Performed by: INTERNAL MEDICINE

## 2019-02-04 ENCOUNTER — TELEPHONE (OUTPATIENT)
Dept: INTERNAL MEDICINE | Age: 84
End: 2019-02-04